# Patient Record
Sex: MALE | Race: BLACK OR AFRICAN AMERICAN | NOT HISPANIC OR LATINO | URBAN - METROPOLITAN AREA
[De-identification: names, ages, dates, MRNs, and addresses within clinical notes are randomized per-mention and may not be internally consistent; named-entity substitution may affect disease eponyms.]

---

## 2019-07-29 ENCOUNTER — TELEPHONE (OUTPATIENT)
Dept: TRANSPLANT | Facility: CLINIC | Age: 59
End: 2019-07-29

## 2019-07-29 NOTE — TELEPHONE ENCOUNTER
----- Message from Cristino Saini MD sent at 7/28/2019 11:24 PM CDT -----  Mike Belle,  My name is Graham Saini, and I am an [employer] fellow. I would like for you all to evaluate Mr. Varela for transplant for his HCC. He is s/p HCV treatment, no current etoh use. Has CKD, and he is currently hospitalized for MADIHA and hyperkalemia. He should be discharged soon, and I would like for him to get evaluated by you all soon. Please let me know what information I can provide to you. Thank you so much.    Patient's alternative number: [phone]    Cristino Saini MD  [facility] GI, PGY VI  [phone]

## 2019-07-31 ENCOUNTER — TELEPHONE (OUTPATIENT)
Dept: TRANSPLANT | Facility: CLINIC | Age: 59
End: 2019-07-31

## 2019-07-31 ENCOUNTER — DOCUMENTATION ONLY (OUTPATIENT)
Dept: TRANSPLANT | Facility: CLINIC | Age: 59
End: 2019-07-31

## 2019-07-31 DIAGNOSIS — K74.69 DECOMPENSATED HCV CIRRHOSIS: ICD-10-CM

## 2019-07-31 DIAGNOSIS — B19.20 DECOMPENSATED HCV CIRRHOSIS: ICD-10-CM

## 2019-07-31 NOTE — TELEPHONE ENCOUNTER
Referral received from Dr Leonardo Saini  Patient with CKD stage III,  HCV  multifocal HCC  Patient  intake as well. .   MELD 20  ICD-10:  B19.20, K74.69  Referred for liver transplant for CONSULT  Referral completed and forwarded to [facility].          Insurance:   PRIMARY: Medicare   ID:[id number]  Contact #

## 2019-08-05 ENCOUNTER — TELEPHONE (OUTPATIENT)
Dept: TRANSPLANT | Facility: CLINIC | Age: 59
End: 2019-08-05

## 2019-08-05 NOTE — TELEPHONE ENCOUNTER
----- Message from Zonia Fernando sent at 8/5/2019 10:40 AM CDT -----  Contact: Sonia/[facility] at [phone]    Returned call to ref md office, but there was no answer. LVM stating that pt is still pending insur clearance.     ----- Message -----  From: Kvng Alexander  Sent: 8/5/2019  10:31 AM  To: [facility] Pre-Liver Transplant Non-Clinical    Please call pt at [phone] and call Ms Scott at [facility]    Patient is calling to schedule his pre-liver appts    Thank you

## 2019-08-05 NOTE — TELEPHONE ENCOUNTER
----- Message from Zonia Fernando sent at 8/5/2019  4:51 PM CDT -----  Contact: pt    Returned call to pt, but there was no answer. LVM stating that he is still pending fin clr.     ----- Message -----  From: Brett España RN  Sent: 8/5/2019   1:41 PM  To: Zonia Fernando        ----- Message -----  From: Aviva Acosta  Sent: 8/5/2019  12:19 PM  To: [facility] Pre-Liver Transplant Clinical    Needs Advice    Reason for call:Returning call         Communication Preference: [phone]

## 2019-08-06 ENCOUNTER — TELEPHONE (OUTPATIENT)
Dept: TRANSPLANT | Facility: CLINIC | Age: 59
End: 2019-08-06

## 2019-08-06 ENCOUNTER — DOCUMENTATION ONLY (OUTPATIENT)
Dept: TRANSPLANT | Facility: CLINIC | Age: 59
End: 2019-08-06

## 2019-08-06 NOTE — TELEPHONE ENCOUNTER
Call to pt, spoke to wife, informed [employer]  require authorization. We are out of network with  [facility]. Instructed she call and see who the insurance is contracted with. [facility] Call to referring roseanne Guevara to call re problems with insurance

## 2019-08-06 NOTE — NURSING
Imaging rec. Via [employer].  7/29/19 us of kidney, ct abd 11-6-18, mri 7/23/19, ct abd  2/22/19,  Mri abd 10-30-18. Requested to pull into pacs.

## 2019-08-09 ENCOUNTER — DOCUMENTATION ONLY (OUTPATIENT)
Dept: TRANSPLANT | Facility: CLINIC | Age: 59
End: 2019-08-09

## 2019-08-09 NOTE — NURSING
Received message from DR Saini. He did receive my update with pt insurance issues. Will get pt back in at [facility] for treatment.
